# Patient Record
Sex: FEMALE | Race: BLACK OR AFRICAN AMERICAN | NOT HISPANIC OR LATINO
[De-identification: names, ages, dates, MRNs, and addresses within clinical notes are randomized per-mention and may not be internally consistent; named-entity substitution may affect disease eponyms.]

---

## 2020-12-16 PROBLEM — Z00.00 ENCOUNTER FOR PREVENTIVE HEALTH EXAMINATION: Status: ACTIVE | Noted: 2020-12-16

## 2020-12-18 ENCOUNTER — APPOINTMENT (OUTPATIENT)
Dept: OTOLARYNGOLOGY | Facility: CLINIC | Age: 60
End: 2020-12-18
Payer: MEDICAID

## 2020-12-18 VITALS
SYSTOLIC BLOOD PRESSURE: 124 MMHG | TEMPERATURE: 98.1 F | OXYGEN SATURATION: 98 % | WEIGHT: 165 LBS | BODY MASS INDEX: 27.49 KG/M2 | HEART RATE: 76 BPM | DIASTOLIC BLOOD PRESSURE: 84 MMHG | HEIGHT: 65 IN

## 2020-12-18 DIAGNOSIS — C73 MALIGNANT NEOPLASM OF THYROID GLAND: ICD-10-CM

## 2020-12-18 DIAGNOSIS — E04.2 NONTOXIC MULTINODULAR GOITER: ICD-10-CM

## 2020-12-18 PROCEDURE — 99072 ADDL SUPL MATRL&STAF TM PHE: CPT

## 2020-12-18 PROCEDURE — 31575 DIAGNOSTIC LARYNGOSCOPY: CPT

## 2020-12-18 PROCEDURE — 99204 OFFICE O/P NEW MOD 45 MIN: CPT | Mod: 25

## 2020-12-18 NOTE — PROCEDURE
[None] : none [Flexible Endoscope] : examined with the flexible endoscope [True Vocal Cords Paralysis] : no true vocal cord paralysis [True Vocal Cords Erythematous] : no true vocal cord edema [True Vocal Cords Kenney's Nodules] : no true vocal cord nodules [Glottis Arytenoid Cartilages] : no arytenoid granulomas [Glottis Arytenoid Cartilages Erythema] : no arytenoid erythema [Normal] : posterior cricoid area had healthy pink mucosa in the interarytenoid area and the esophageal inlet

## 2020-12-18 NOTE — PHYSICAL EXAM
[Normal] : mucosa is normal [Midline] : trachea located in midline position [de-identified] : palpable right thyroid nodule [de-identified] : L EAC with some cerumen- patient declined cleaning

## 2020-12-18 NOTE — REASON FOR VISIT
[Initial Consultation] : an initial consultation for [FreeTextEntry2] : pre-op for thyroid lobectomy

## 2020-12-18 NOTE — ASSESSMENT
[FreeTextEntry1] : 60F who presents per referral from Dr. Yee for papillar thyroid carcinoma, needing lobectomy. \par \par \par Plan:\par - Plan for OR for total thyroidectomy\par - admits to history of issues with anesthesia and issues with inability to wean off the ventilator\par - pre-op clearance\par - pre-op COVID

## 2021-02-02 ENCOUNTER — LABORATORY RESULT (OUTPATIENT)
Age: 61
End: 2021-02-02

## 2021-02-05 ENCOUNTER — APPOINTMENT (OUTPATIENT)
Dept: OTOLARYNGOLOGY | Facility: HOSPITAL | Age: 61
End: 2021-02-05

## 2021-05-04 ENCOUNTER — LABORATORY RESULT (OUTPATIENT)
Age: 61
End: 2021-05-04

## 2021-05-06 ENCOUNTER — TRANSCRIPTION ENCOUNTER (OUTPATIENT)
Age: 61
End: 2021-05-06

## 2021-05-07 ENCOUNTER — OUTPATIENT (OUTPATIENT)
Dept: OUTPATIENT SERVICES | Facility: HOSPITAL | Age: 61
LOS: 1 days | Discharge: HOME | End: 2021-05-07
Payer: MEDICAID

## 2021-05-07 ENCOUNTER — APPOINTMENT (OUTPATIENT)
Dept: OTOLARYNGOLOGY | Facility: HOSPITAL | Age: 61
End: 2021-05-07

## 2021-05-07 ENCOUNTER — RESULT REVIEW (OUTPATIENT)
Age: 61
End: 2021-05-07

## 2021-05-07 VITALS
OXYGEN SATURATION: 99 % | SYSTOLIC BLOOD PRESSURE: 110 MMHG | DIASTOLIC BLOOD PRESSURE: 74 MMHG | HEART RATE: 93 BPM | RESPIRATION RATE: 16 BRPM

## 2021-05-07 VITALS
OXYGEN SATURATION: 97 % | TEMPERATURE: 209 F | HEART RATE: 112 BPM | DIASTOLIC BLOOD PRESSURE: 91 MMHG | SYSTOLIC BLOOD PRESSURE: 132 MMHG | WEIGHT: 0.01 LBS | RESPIRATION RATE: 18 BRPM | HEIGHT: 55 IN

## 2021-05-07 DIAGNOSIS — Z98.890 OTHER SPECIFIED POSTPROCEDURAL STATES: Chronic | ICD-10-CM

## 2021-05-07 DIAGNOSIS — Z90.711 ACQUIRED ABSENCE OF UTERUS WITH REMAINING CERVICAL STUMP: Chronic | ICD-10-CM

## 2021-05-07 DIAGNOSIS — Z98.51 TUBAL LIGATION STATUS: Chronic | ICD-10-CM

## 2021-05-07 LAB — CALCIUM SERPL-MCNC: 9.3 MG/DL — SIGNIFICANT CHANGE UP (ref 8.4–10.5)

## 2021-05-07 PROCEDURE — 82310 ASSAY OF CALCIUM: CPT

## 2021-05-07 PROCEDURE — C1889: CPT

## 2021-05-07 PROCEDURE — 60240 REMOVAL OF THYROID: CPT

## 2021-05-07 PROCEDURE — 88307 TISSUE EXAM BY PATHOLOGIST: CPT | Mod: 26

## 2021-05-07 PROCEDURE — 88307 TISSUE EXAM BY PATHOLOGIST: CPT

## 2021-05-07 RX ORDER — ACETAMINOPHEN 500 MG
650 TABLET ORAL EVERY 6 HOURS
Refills: 0 | Status: DISCONTINUED | OUTPATIENT
Start: 2021-05-07 | End: 2021-05-07

## 2021-05-07 RX ORDER — CALCIUM CARBONATE 500(1250)
1 TABLET ORAL
Qty: 90 | Refills: 0
Start: 2021-05-07 | End: 2021-06-05

## 2021-05-07 RX ORDER — CALCITRIOL 0.5 UG/1
1 CAPSULE ORAL
Qty: 60 | Refills: 0
Start: 2021-05-07 | End: 2021-06-05

## 2021-05-07 RX ORDER — LEVOTHYROXINE SODIUM 125 MCG
1 TABLET ORAL
Qty: 30 | Refills: 0
Start: 2021-05-07 | End: 2021-06-05

## 2021-05-07 RX ORDER — ONDANSETRON 8 MG/1
4 TABLET, FILM COATED ORAL EVERY 6 HOURS
Refills: 0 | Status: DISCONTINUED | OUTPATIENT
Start: 2021-05-07 | End: 2021-05-07

## 2021-05-07 RX ORDER — OXYCODONE AND ACETAMINOPHEN 5; 325 MG/1; MG/1
1 TABLET ORAL EVERY 6 HOURS
Refills: 0 | Status: DISCONTINUED | OUTPATIENT
Start: 2021-05-07 | End: 2021-05-07

## 2021-05-07 RX ORDER — OXYCODONE AND ACETAMINOPHEN 5; 325 MG/1; MG/1
2 TABLET ORAL EVERY 6 HOURS
Refills: 0 | Status: DISCONTINUED | OUTPATIENT
Start: 2021-05-07 | End: 2021-05-07

## 2021-05-07 RX ADMIN — OXYCODONE AND ACETAMINOPHEN 2 TABLET(S): 5; 325 TABLET ORAL at 20:24

## 2021-05-08 ENCOUNTER — EMERGENCY (EMERGENCY)
Facility: HOSPITAL | Age: 61
LOS: 1 days | Discharge: HOME | End: 2021-05-08
Attending: EMERGENCY MEDICINE | Admitting: PSYCHIATRY & NEUROLOGY
Payer: MEDICAID

## 2021-05-08 VITALS
DIASTOLIC BLOOD PRESSURE: 79 MMHG | SYSTOLIC BLOOD PRESSURE: 126 MMHG | TEMPERATURE: 98 F | OXYGEN SATURATION: 96 % | HEART RATE: 82 BPM | RESPIRATION RATE: 17 BRPM

## 2021-05-08 VITALS
DIASTOLIC BLOOD PRESSURE: 82 MMHG | TEMPERATURE: 98 F | HEIGHT: 67 IN | HEART RATE: 84 BPM | WEIGHT: 164.91 LBS | SYSTOLIC BLOOD PRESSURE: 123 MMHG | RESPIRATION RATE: 17 BRPM | OXYGEN SATURATION: 98 %

## 2021-05-08 DIAGNOSIS — S10.83XA CONTUSION OF OTHER SPECIFIED PART OF NECK, INITIAL ENCOUNTER: ICD-10-CM

## 2021-05-08 DIAGNOSIS — Z98.51 TUBAL LIGATION STATUS: Chronic | ICD-10-CM

## 2021-05-08 DIAGNOSIS — Z98.890 OTHER SPECIFIED POSTPROCEDURAL STATES: Chronic | ICD-10-CM

## 2021-05-08 DIAGNOSIS — Z90.09 ACQUIRED ABSENCE OF OTHER PART OF HEAD AND NECK: ICD-10-CM

## 2021-05-08 DIAGNOSIS — I10 ESSENTIAL (PRIMARY) HYPERTENSION: ICD-10-CM

## 2021-05-08 DIAGNOSIS — Z90.711 ACQUIRED ABSENCE OF UTERUS WITH REMAINING CERVICAL STUMP: Chronic | ICD-10-CM

## 2021-05-08 DIAGNOSIS — E89.0 POSTPROCEDURAL HYPOTHYROIDISM: Chronic | ICD-10-CM

## 2021-05-08 DIAGNOSIS — T81.89XA OTHER COMPLICATIONS OF PROCEDURES, NOT ELSEWHERE CLASSIFIED, INITIAL ENCOUNTER: ICD-10-CM
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
## 2021-05-08 DIAGNOSIS — Z79.899 OTHER LONG TERM (CURRENT) DRUG THERAPY: ICD-10-CM

## 2021-05-08 DIAGNOSIS — R22.0 LOCALIZED SWELLING, MASS AND LUMP, HEAD: ICD-10-CM
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
## 2021-05-08 PROBLEM — Z88.9 ALLERGY STATUS TO UNSPECIFIED DRUGS, MEDICAMENTS AND BIOLOGICAL SUBSTANCES: Chronic | Status: ACTIVE | Noted: 2021-05-06

## 2021-05-08 PROCEDURE — 99284 EMERGENCY DEPT VISIT MOD MDM: CPT

## 2021-05-08 NOTE — CONSULT NOTE ADULT - ASSESSMENT
60F PMH HTN now POD1 s/p total thyroidectomy presents with neck swelling and outside CT suggesting likely superficial hematoma    ***  60F PMH HTN now POD1 s/p total thyroidectomy presents with neck swelling and outside CT suggesting likely superficial hematoma. Pt currently stable with no respiratory distress.    Recommend continued observation in ED for 4 hours  No further acute ENT intervention at this time   Case discussed with Dr. Germain at length   If exam and symptoms unchanged after observation, patient may be discharged home  Plan for patient to follow-up with Dr. Germain on Friday  Please page ENT with additional questions

## 2021-05-08 NOTE — ED ADULT TRIAGE NOTE - CHIEF COMPLAINT QUOTE
Patient had thyroidectomy done yesterday , this am woke with throat swelling with pain . No drooling noted ,able to speak with full sentences . No sob .

## 2021-05-08 NOTE — ED PROVIDER NOTE - PSH
H/O thyroidectomy    History of bilateral breast biopsy    History of bilateral tubal ligation    S/P partial hysterectomy

## 2021-05-08 NOTE — ED PROVIDER NOTE - ENMT, MLM
Airway patent, Nasal mucosa clear. Mouth with normal mucosa. Throat has no vesicles, no oropharyngeal exudates and uvula is midline. + anterior neck swelling w/hematoma surrounding incision, no crepitus, no erythema, no pus, no bleeding, no warmth, no tend, no stridor

## 2021-05-08 NOTE — ED PROVIDER NOTE - ATTENDING CONTRIBUTION TO CARE
61 yo female h/o HTN s/p thyroidectomy yest w drain removed 9p last night c/o firm swelling in throat.  Pt eval at [facility] and per ENT resident who cared for pt during admit and discussed case w ED and ENT at [facility] today - hgb nl, CT w hematoma, patent airway.  Pt signed out ama and came to ed for eval by her ENT team.  No fever, cp, sob, difficulty swallowing, talking, breathing.  Well appearing, nad, nc/at, op benign, incision w dried blood, intact steri strips, + swelling  - soft, mild tender, no crepitus, lung cta, heart reg.  Pt declined repeat labs.  ENT eval pt and felt hematoma was soft, plan for obs in ed x 3 h - if stable, dc.

## 2021-05-08 NOTE — ED PROVIDER NOTE - OBJECTIVE STATEMENT
The pt is a 59 y/o F, who presents to ED c/o neck swelling since this am - had total thyroidectomy yest. Pt woke up with swelling and bruising to incision site, went to [facility] - had labs and was advised to f/u w/her ent - hence here now. Denies cp, sob, dysphagia, n/v/d, abd pain, fevers, chills, h/a, syncope, neck trauma.

## 2021-05-08 NOTE — ED ADULT NURSE NOTE - PSH
History of bilateral breast biopsy    History of bilateral tubal ligation    S/P partial hysterectomy

## 2021-05-08 NOTE — ED PROVIDER NOTE - NSFOLLOWUPINSTRUCTIONS_ED_ALL_ED_FT
Hematoma  WHAT YOU NEED TO KNOW:  A hematoma is a collection of blood. A bruise is a type of hematoma. A hematoma may form in a muscle or in the tissues just under the skin. A hematoma that forms under the skin will feel like a bump or hard mass. Hematomas can happen anywhere in your body, including in your brain. Your body may break down and absorb a mild hematoma on its own. A more serious hematoma may need treatment.   DISCHARGE INSTRUCTIONS:  Medicines: You may need any of the following:   •Prescription pain medicine may be given. Ask how to take this medicine safely.  •NSAIDs, such as ibuprofen, help decrease swelling, pain, and fever. This medicine is available with or without a doctor's order. NSAIDs can cause stomach bleeding or kidney problems in certain people. If you take blood thinner medicine, always ask your healthcare provider if NSAIDs are safe for you. Always read the medicine label and follow directions.  •Antibiotics prevent or treat a bacterial infection.  •Take your medicine as directed. Contact your healthcare provider if you think your medicine is not helping or if you have side effects. Tell him of her if you are allergic to any medicine. Keep a list of the medicines, vitamins, and herbs you take. Include the amounts, and when and why you take them. Bring the list or the pill bottles to follow-up visits. Carry your medicine list with you in case of an emergency  Return to the emergency department if:   •You have new or worsening pain, or pain that does not get better with medicine.  •You have a fever.  •You have trouble moving the body part that has the hematoma.  Contact your healthcare provider if:   •You have questions or concerns about your condition or care.  Follow up with your healthcare provider as directed: You may need to have surgery if your hematoma is severe. You may also need other tests to make sure there is no other damage that needs to be treated. Write down your questions so you remember to ask them during your visits.  Self-care:   •Rest the area. Rest will help your body heal and will also help prevent more damage.  •Apply ice as directed. Ice helps reduce swelling. Ice may also help prevent tissue damage. Use an ice pack, or put crushed ice in a bag. Cover it with a towel. Place it on your hematoma for 20 minutes every hour, or as directed. Ask how many times each day to apply ice, and for how many days.  •Compress the injury if possible. Lightly wrap the injury with an elastic or soft bandage. This may help control swelling. Ask your healthcare provider how to wrap your injury properly.   •Elevate the area as directed. If possible, raise the area above the level of your heart as often as you can. This will help decrease swelling.   •Keep the hematoma covered with a bandage. This will help protect the area while it heals.

## 2021-05-08 NOTE — CONSULT NOTE ADULT - SUBJECTIVE AND OBJECTIVE BOX
HPI: 60y Female PMH HTN presents to [facility] ED POD1 s/p total thyroidectomy (Dr. Germain) with neck swelling. Pt was initially seen and examined at bedside in the PACU 6 hours after the procedure at which time a small TAMIKA drain was removed and the neck appeared to be soft/flat. Pt then presented to [place] ED earlier this morning complaining of neck swelling without dysphagia or SOB and the ED provider called ENT at [facility] to discuss the case. CT neck was obtained at [place] which revealed a superficial hematoma with patent airway. CT images were reviewed via video text message from [place] ED provider. The pt was examined by ENT Dr. Alejandro Contreras at [place] ED who also discussed case with Dr. Germain and agreed pt should be observed without intervention. Pt then signed out AMA from [place] ED and presented to [facility].     Allergies    No Known Allergies    Intolerances        PAST MEDICAL & SURGICAL HISTORY:  Hypertension, unspecified type    History of seasonal allergies    History of bilateral tubal ligation    S/P partial hysterectomy    History of bilateral breast biopsy        MEDICATIONS:  Antiinfectives:       Hematologic/Anticoagulation:      Pain medications/Neuro:      IV fluids:      Endocrine Medications:       All other standing medications:       All other PRN medications:      Vital Signs Last 24 Hrs  T(C): 36.6 (08 May 2021 14:27), Max: 36.6 (08 May 2021 14:27)  T(F): 97.9 (08 May 2021 14:27), Max: 97.9 (08 May 2021 14:27)  HR: 84 (08 May 2021 14:27) (73 - 97)  BP: 123/82 (08 May 2021 14:27) (106/75 - 132/80)  BP(mean): 88 (07 May 2021 20:00) (86 - 101)  RR: 17 (08 May 2021 14:27) (13 - 21)  SpO2: 98% (08 May 2021 14:27) (98% - 100%)    LABS:  CBC-      Ca    9.3      07 May 2021 16:57      Coagulation Studies-    Endocrine Panel-  --  --  9.3 mg/dL        PHYSICAL EXAM:    ENT EXAM-   Constitutional: Well-developed, well-nourished.  No hoarseness.     Head:  normocephalic, atraumatic.   Nose:  Septum intact, Inferior turbinates normal bilateral  OC/OP:  Floor of mouth, buccal mucosa, lips, hard palate, soft palate, uvula, posterior pharyngeal wall normal.  Mucosa moist.  Neck:  ***  Lymph:  No cervical adenopathy.    MULTISYSTEM EXAM-  Neuro/Psych:  A&O x 3.    Cranial nerves: 2-12 grossly intact bilaterally.  Eyes:  EOMI, no nystagmus.  Pulm:  No dyspnea, non-labored breathing  Cardiovascular: Carotid pulses 2+ bilaterally.  No peripheral edema.  Skin:  No rash or lesions on exposed skin of head/neck      RADIOLOGY & ADDITIONAL STUDIES:  CT neck reviewed (my interpretation): superficial hematoma with patent airway     HPI: 60y Female PMH HTN presents to [facility] ED POD1 s/p total thyroidectomy (Dr. Germain) with neck swelling. Pt was initially seen and examined at bedside in the PACU 6 hours after the procedure. At that time the neck appeared soft/flat and a small TAMIKA drain was removed and the pt was discharged home. Pt then presented to [place] ED earlier this morning complaining of neck swelling without dysphagia or SOB and the ED provider called ENT at [facility] to discuss the case. CT neck was obtained at [place] which revealed a superficial hematoma with patent airway. CT images were reviewed via video text message from [place] ED provider. The pt was examined by ENT Dr. Alejandro Contreras at [place] ED who also discussed case with Dr. Germain and agreed pt should be observed without intervention. Pt then signed out AMA from [place] ED and presented to [facility].     Allergies    No Known Allergies    Intolerances        PAST MEDICAL & SURGICAL HISTORY:  Hypertension, unspecified type    History of seasonal allergies    History of bilateral tubal ligation    S/P partial hysterectomy    History of bilateral breast biopsy        MEDICATIONS:  Antiinfectives:       Hematologic/Anticoagulation:      Pain medications/Neuro:      IV fluids:      Endocrine Medications:       All other standing medications:       All other PRN medications:      Vital Signs Last 24 Hrs  T(C): 36.6 (08 May 2021 14:27), Max: 36.6 (08 May 2021 14:27)  T(F): 97.9 (08 May 2021 14:27), Max: 97.9 (08 May 2021 14:27)  HR: 84 (08 May 2021 14:27) (73 - 97)  BP: 123/82 (08 May 2021 14:27) (106/75 - 132/80)  BP(mean): 88 (07 May 2021 20:00) (86 - 101)  RR: 17 (08 May 2021 14:27) (13 - 21)  SpO2: 98% (08 May 2021 14:27) (98% - 100%)    LABS:  CBC-      Ca    9.3      07 May 2021 16:57      Coagulation Studies-    Endocrine Panel-  --  --  9.3 mg/dL        PHYSICAL EXAM:    ENT EXAM-   Constitutional: Well-developed, well-nourished.  No hoarseness.     Head:  normocephalic, atraumatic.   Nose:  Septum intact, Inferior turbinates normal bilateral  OC/OP:  Floor of mouth, buccal mucosa, lips, hard palate, soft palate, uvula, posterior pharyngeal wall normal.  Mucosa moist.  Neck:  ***  Lymph:  No cervical adenopathy.    MULTISYSTEM EXAM-  Neuro/Psych:  A&O x 3.    Cranial nerves: 2-12 grossly intact bilaterally.  Eyes:  EOMI, no nystagmus.  Pulm:  No dyspnea, non-labored breathing  Cardiovascular: Carotid pulses 2+ bilaterally.  No peripheral edema.  Skin:  No rash or lesions on exposed skin of head/neck      RADIOLOGY & ADDITIONAL STUDIES:  CT neck reviewed (my interpretation): superficial hematoma with patent airway     HPI: 60y Female PMH HTN presents to [facility] ED POD1 s/p total thyroidectomy (Dr. Germain) with neck swelling. Pt was initially seen and examined at bedside in the PACU 6 hours after the procedure. At that time the neck appeared soft/flat and a small TAMIKA drain was removed prior to discharge home from PACU. Pt then presented to [place] ED earlier this morning complaining of neck swelling without dysphagia or SOB and the ED provider called ENT at [facility] to discuss the case. CT neck was obtained at [place] which revealed a superficial hematoma with patent airway. CT images were reviewed via video text message from [place] ED provider. The pt was examined by ENT Dr. Alejandro Contreras at [place] ED who also discussed case with Dr. Germain and agreed pt should be observed without intervention. Pt then signed out AMA from [place] ED and presented to [facility].     Allergies    No Known Allergies    Intolerances        PAST MEDICAL & SURGICAL HISTORY:  Hypertension, unspecified type    History of seasonal allergies    History of bilateral tubal ligation    S/P partial hysterectomy    History of bilateral breast biopsy        MEDICATIONS:  Antiinfectives:       Hematologic/Anticoagulation:      Pain medications/Neuro:      IV fluids:      Endocrine Medications:       All other standing medications:       All other PRN medications:      Vital Signs Last 24 Hrs  T(C): 36.6 (08 May 2021 14:27), Max: 36.6 (08 May 2021 14:27)  T(F): 97.9 (08 May 2021 14:27), Max: 97.9 (08 May 2021 14:27)  HR: 84 (08 May 2021 14:27) (73 - 97)  BP: 123/82 (08 May 2021 14:27) (106/75 - 132/80)  BP(mean): 88 (07 May 2021 20:00) (86 - 101)  RR: 17 (08 May 2021 14:27) (13 - 21)  SpO2: 98% (08 May 2021 14:27) (98% - 100%)    LABS:  CBC-      Ca    9.3      07 May 2021 16:57      Coagulation Studies-    Endocrine Panel-  --  --  9.3 mg/dL        PHYSICAL EXAM:    ENT EXAM-   Constitutional: Well-developed, well-nourished.  No hoarseness.     Head:  normocephalic, atraumatic.   Nose:  Septum intact, Inferior turbinates normal bilateral  OC/OP:  Floor of mouth, buccal mucosa, lips, hard palate, soft palate, uvula, posterior pharyngeal wall normal.  Mucosa moist.  Neck:  ***  Lymph:  No cervical adenopathy.    MULTISYSTEM EXAM-  Neuro/Psych:  A&O x 3.    Cranial nerves: 2-12 grossly intact bilaterally.  Eyes:  EOMI, no nystagmus.  Pulm:  No dyspnea, non-labored breathing  Cardiovascular: Carotid pulses 2+ bilaterally.  No peripheral edema.  Skin:  No rash or lesions on exposed skin of head/neck      RADIOLOGY & ADDITIONAL STUDIES:  CT neck reviewed (my interpretation): superficial hematoma with patent airway     HPI: 60y Female PMH HTN presents to [facility] ED POD1 s/p total thyroidectomy (Dr. Germain) with neck swelling. Pt was initially seen and examined at bedside in the PACU 6 hours after the procedure. At that time the neck appeared soft/flat and a small TAMIKA drain was removed prior to discharge home from PACU. Pt then presented to [place] ED earlier this morning complaining of neck swelling without dysphagia or SOB and the ED provider called ENT at [facility] to discuss the case. CT neck was obtained at [place] which revealed a superficial hematoma with patent airway. CT images were reviewed via video text message from [place] ED provider. The pt was examined by ENT Dr. Alejandro Contreras at [place] ED who also discussed case with Dr. Germain and agreed pt should be observed without intervention. Pt then signed out AMA from [place] ED and presented to [facility].     Allergies    No Known Allergies    Intolerances        PAST MEDICAL & SURGICAL HISTORY:  Hypertension, unspecified type    History of seasonal allergies    History of bilateral tubal ligation    S/P partial hysterectomy    History of bilateral breast biopsy        MEDICATIONS:  Antiinfectives:       Hematologic/Anticoagulation:      Pain medications/Neuro:      IV fluids:      Endocrine Medications:       All other standing medications:       All other PRN medications:      Vital Signs Last 24 Hrs  T(C): 36.6 (08 May 2021 14:27), Max: 36.6 (08 May 2021 14:27)  T(F): 97.9 (08 May 2021 14:27), Max: 97.9 (08 May 2021 14:27)  HR: 84 (08 May 2021 14:27) (73 - 97)  BP: 123/82 (08 May 2021 14:27) (106/75 - 132/80)  BP(mean): 88 (07 May 2021 20:00) (86 - 101)  RR: 17 (08 May 2021 14:27) (13 - 21)  SpO2: 98% (08 May 2021 14:27) (98% - 100%)    LABS:  CBC-  Outside labs from [facility] [place]:   WBC 11.02  HGB 10.9  HCT 32.4      BMP  Na 135  K 4.8  Cl 95  CO2 19  BUN 18  Cr 1.4  Ca 9      Ca    9.3      07 May 2021 16:57      Coagulation Studies-    Endocrine Panel-  --  --  9.3 mg/dL        PHYSICAL EXAM:    ENT EXAM-   Constitutional: Well-developed, well-nourished.  No hoarseness.     Head:  normocephalic, atraumatic.   Nose:  Septum intact, Inferior turbinates normal bilateral  OC/OP:  Floor of mouth, buccal mucosa, lips, hard palate, soft palate, uvula, posterior pharyngeal wall normal.  Mucosa moist.  Neck:  Supra and infra-incisional edema, soft, non TTP, non-fluctuant, mild supra-incisional ecchymosis. Steri strip covering incision.   Lymph:  No cervical adenopathy.    MULTISYSTEM EXAM-  Neuro/Psych:  A&O x 3.    Cranial nerves: 2-12 grossly intact bilaterally.  Eyes:  EOMI, no nystagmus.  Pulm:  No dyspnea, non-labored breathing  Cardiovascular: Carotid pulses 2+ bilaterally.  No peripheral edema.  Skin:  No rash or lesions on exposed skin of head/neck      RADIOLOGY & ADDITIONAL STUDIES:  CT neck reviewed (my interpretation): superficial hematoma with patent airway

## 2021-05-08 NOTE — ED ADULT NURSE REASSESSMENT NOTE - NS ED NURSE REASSESS COMMENT FT1
Pt refuses IV and lab work. TEODORO Dubose made aware. ENT team consulted. As per ENT, "pt does not need lab work or an IV". Will continue to monitor. VSS.

## 2021-05-08 NOTE — ED PROVIDER NOTE - CLINICAL SUMMARY MEDICAL DECISION MAKING FREE TEXT BOX
pt s/p thyroidectomy yest, today w/swelling to neck around incision site, no bleeding/no pus/no stridor or resp distress, no induration or tend on exam, seen by ent upon arrival to ed - not wanting labs but recommending obs x few hrs - if unchanged status then dc home

## 2021-05-08 NOTE — ED ADULT NURSE NOTE - OBJECTIVE STATEMENT
PT AOX4. Pt c/o neck swelling since this morning. Pt states "I had a thyroidectomy procedure yesterday and when I woke up this morning I noticed my neck looked very swollen and it is painful to swallow". Pt denies drooling, difficulty swallowing, changes in voice. Airway patent. Swelling noted to neck, generalized. No obvious deformity, bruising, or bleeding noted. Pt denies fevers, chills, n/v, SOB, chest pain. Pt speaking in full complete sentences. respirations even and unlabored.

## 2021-05-08 NOTE — ED PROVIDER NOTE - PATIENT PORTAL LINK FT
You can access the FollowMyHealth Patient Portal offered by [facility] by registering at the following website: [email or web address]. By joining [facility]’s FollowMyHealth portal, you will also be able to view your health information using other applications (apps) compatible with our system.

## 2021-05-14 ENCOUNTER — APPOINTMENT (OUTPATIENT)
Dept: OTOLARYNGOLOGY | Facility: CLINIC | Age: 61
End: 2021-05-14
Payer: MEDICAID

## 2021-05-14 VITALS
HEART RATE: 98 BPM | DIASTOLIC BLOOD PRESSURE: 96 MMHG | HEIGHT: 65 IN | BODY MASS INDEX: 27.49 KG/M2 | OXYGEN SATURATION: 98 % | SYSTOLIC BLOOD PRESSURE: 142 MMHG | WEIGHT: 165 LBS

## 2021-05-14 PROCEDURE — 99024 POSTOP FOLLOW-UP VISIT: CPT

## 2021-05-14 NOTE — PHYSICAL EXAM
[de-identified] : right subcutaneous hematoma evacuated, suture removed  [Normal] : mucosa is normal [Midline] : trachea located in midline position

## 2021-05-14 NOTE — HISTORY OF PRESENT ILLNESS
[de-identified] : 61F who is 1 week post op from total thyroidectomy. She reports post op she had a collection in the neck with swelling causing her to have SOB which prompted her first to [facility]. There they recommended surgery, but she refused and then was transferred to [facility] where she was observed with decrease in swelling. She denies any dysphagia and hoarseness. No other ENT complaints. no pertinent Fh/Sh.

## 2021-05-17 LAB — SURGICAL PATHOLOGY STUDY: SIGNIFICANT CHANGE UP

## 2021-05-21 ENCOUNTER — APPOINTMENT (OUTPATIENT)
Dept: OTOLARYNGOLOGY | Facility: CLINIC | Age: 61
End: 2021-05-21
Payer: MEDICAID

## 2021-05-21 VITALS
HEART RATE: 91 BPM | OXYGEN SATURATION: 98 % | SYSTOLIC BLOOD PRESSURE: 132 MMHG | DIASTOLIC BLOOD PRESSURE: 92 MMHG | BODY MASS INDEX: 27.49 KG/M2 | HEIGHT: 65 IN | WEIGHT: 165 LBS | TEMPERATURE: 93.4 F

## 2021-05-21 PROCEDURE — 99024 POSTOP FOLLOW-UP VISIT: CPT

## 2021-05-21 NOTE — PHYSICAL EXAM
[Normal] : temporomandibular joint is normal [de-identified] : palpable hematoma of the total thyroid surgical site

## 2021-05-21 NOTE — HISTORY OF PRESENT ILLNESS
[de-identified] : 61F who is 2 weeks post op from total thyroid c/b seroma/hematoma. Patient denies any SOB, dysphagia, hoarseness. She is following with her endocrinologist for management of her thyroid levels. No other ENt complaints. No pertinent FH/Sh.

## 2021-05-21 NOTE — ASSESSMENT
[FreeTextEntry1] : 61F who is 2 weeks post from total thyroid with post op hematoma. \par \par Plan:\par - f/u in 2 weeks

## 2021-06-04 ENCOUNTER — APPOINTMENT (OUTPATIENT)
Dept: OTOLARYNGOLOGY | Facility: CLINIC | Age: 61
End: 2021-06-04
Payer: MEDICAID

## 2021-06-04 VITALS
BODY MASS INDEX: 27.49 KG/M2 | HEIGHT: 65 IN | SYSTOLIC BLOOD PRESSURE: 122 MMHG | DIASTOLIC BLOOD PRESSURE: 87 MMHG | WEIGHT: 165 LBS | OXYGEN SATURATION: 98 % | TEMPERATURE: 96.9 F | HEART RATE: 100 BPM

## 2021-06-04 DIAGNOSIS — E89.0 POSTPROCEDURAL HYPOTHYROIDISM: ICD-10-CM

## 2021-06-04 PROCEDURE — 99024 POSTOP FOLLOW-UP VISIT: CPT

## 2023-11-24 NOTE — ED PROVIDER NOTE - CPE EDP PSYCH NORM
Left LE grossly 3/5, right LE grossly 3/5, bilateral UE grossly 3+/5 to WFL/WNL/grossly assessed due to
normal...

## 2024-04-08 NOTE — ED PROVIDER NOTE - CONSTITUTIONAL DISTRESS
Last OV: 12/06/2023  Juaquin  Last Labs: EKG-12/14/2022  J[facility]quin  Next OV: 12/13/2024  Juaquin  Last Refill: Atorvastatin-03/22/2023  Juaquin   no apparent

## 2024-07-11 NOTE — ASU PATIENT PROFILE, ADULT - PACKS PER DAY
Do not wear your contacts for 72 hours.  Use Ilotycin ointment as prescribed.  Motrin Tylenol for pain.  Follow up with your doctor in 1-2 days and an eye doctor today or tomorrow.  Return as needed   0